# Patient Record
Sex: MALE | Race: ASIAN
[De-identification: names, ages, dates, MRNs, and addresses within clinical notes are randomized per-mention and may not be internally consistent; named-entity substitution may affect disease eponyms.]

---

## 2019-01-18 ENCOUNTER — HOSPITAL ENCOUNTER (EMERGENCY)
Dept: HOSPITAL 74 - JER | Age: 24
Discharge: HOME | End: 2019-01-18
Payer: COMMERCIAL

## 2019-01-18 VITALS — TEMPERATURE: 98.4 F | SYSTOLIC BLOOD PRESSURE: 112 MMHG | DIASTOLIC BLOOD PRESSURE: 62 MMHG | HEART RATE: 80 BPM

## 2019-01-18 VITALS — BODY MASS INDEX: 28.1 KG/M2

## 2019-01-18 DIAGNOSIS — N50.811: Primary | ICD-10-CM

## 2019-01-18 LAB
ANION GAP SERPL CALC-SCNC: 7 MMOL/L (ref 8–16)
APPEARANCE UR: (no result)
BASOPHILS # BLD: 0.4 % (ref 0–2)
BILIRUB UR STRIP.AUTO-MCNC: NEGATIVE MG/DL
BUN SERPL-MCNC: 14 MG/DL (ref 7–18)
CALCIUM SERPL-MCNC: 9.9 MG/DL (ref 8.5–10.1)
CHLORIDE SERPL-SCNC: 105 MMOL/L (ref 98–107)
CO2 SERPL-SCNC: 28 MMOL/L (ref 21–32)
COLOR UR: (no result)
CREAT SERPL-MCNC: 1.2 MG/DL (ref 0.55–1.3)
DEPRECATED RDW RBC AUTO: 12.4 % (ref 11.9–15.9)
EOSINOPHIL # BLD: 0.1 % (ref 0–4.5)
EPITH CASTS URNS QL MICRO: (no result) /HPF
GLUCOSE SERPL-MCNC: 87 MG/DL (ref 74–106)
HCT VFR BLD CALC: 47.6 % (ref 35.4–49)
HGB BLD-MCNC: 16.4 GM/DL (ref 11.7–16.9)
KETONES UR QL STRIP: (no result)
LEUKOCYTE ESTERASE UR QL STRIP.AUTO: NEGATIVE
LYMPHOCYTES # BLD: 6.8 % (ref 8–40)
MCH RBC QN AUTO: 30.8 PG (ref 25.7–33.7)
MCHC RBC AUTO-ENTMCNC: 34.3 G/DL (ref 32–35.9)
MCV RBC: 89.7 FL (ref 80–96)
MONOCYTES # BLD AUTO: 2.6 % (ref 3.8–10.2)
MUCOUS THREADS URNS QL MICRO: (no result)
NEUTROPHILS # BLD: 90.1 % (ref 42.8–82.8)
NITRITE UR QL STRIP: NEGATIVE
PH UR: 5 [PH] (ref 5–8)
PLATELET # BLD AUTO: 169 K/MM3 (ref 134–434)
PMV BLD: 11 FL (ref 7.5–11.1)
POTASSIUM SERPLBLD-SCNC: 4.1 MMOL/L (ref 3.5–5.1)
PROT UR QL STRIP: (no result)
PROT UR QL STRIP: NEGATIVE
RBC # BLD AUTO: 5.31 M/MM3 (ref 4–5.6)
SODIUM SERPL-SCNC: 140 MMOL/L (ref 136–145)
SP GR UR: 1.02 (ref 1.01–1.03)
UROBILINOGEN UR STRIP-MCNC: NEGATIVE MG/DL (ref 0.2–1)
WBC # BLD AUTO: 16.4 K/MM3 (ref 4–10)

## 2019-01-18 PROCEDURE — 3E0337Z INTRODUCTION OF ELECTROLYTIC AND WATER BALANCE SUBSTANCE INTO PERIPHERAL VEIN, PERCUTANEOUS APPROACH: ICD-10-PCS

## 2019-01-18 NOTE — PDOC
Rapid Medical Evaluation


Time Seen by Provider: 01/18/19 14:06


Medical Evaluation: 


 Allergies











Allergy/AdvReac Type Severity Reaction Status Date / Time


 


No Known Allergies Allergy   Verified 03/02/15 17:57











01/18/19 14:06





I have performed a brief in-person evaluation of this patient.





The patient presents with a chief complaint of: Sudden onset R flank pain 

radiating to R groin x 1 hr. No other sxs. No h/o renal stones. No sig hx





Pertinent physical exam findings:Stable, no obvious CVAT, NT to abd





I have ordered the following:labs/ua





The patient will proceed to the ED for further evaluation.











**Discharge Disposition





- Diagnosis


 Right flank pain








- Referrals





- Patient Instructions





- Post Discharge Activity

## 2019-01-18 NOTE — PDOC
History of Present Illness





- General


Chief Complaint: Pain


Stated Complaint: LOWER BACK PAIN


Time Seen by Provider: 01/18/19 14:06


History Source: Patient


Exam Limitations: No Limitations





Past History





- Past Medical History


Allergies/Adverse Reactions: 


 Allergies











Allergy/AdvReac Type Severity Reaction Status Date / Time


 


No Known Allergies Allergy   Verified 01/18/19 14:07











Home Medications: 


Ambulatory Orders





NK [No Known Home Medication]  03/02/15 








Asthma: Yes


COPD: No





- Immunization History


Immunization Up to Date: Yes





- Suicide/Smoking/Psychosocial Hx


Smoking History: Never smoked


Hx Alcohol Use: No


Drug/Substance Use Hx: No


Substance Use Type: None





*Physical Exam





- Vital Signs


 Last Vital Signs











Temp Pulse Resp BP Pulse Ox


 


 97.6 F   73   18   116/61   98 


 


 01/18/19 14:07  01/18/19 14:07  01/18/19 14:07  01/18/19 14:07  01/18/19 14:07














- Physical Exam


General Appearance: No: Apparent Distress


Respiratory/Chest: positive: Lungs Clear, Normal Breath Sounds.  negative: 

Respiratory Distress


Cardiovascular: positive: Regular Rhythm, Regular Rate, S1, S2.  negative: 

Murmur


Gastrointestinal/Abdominal: positive: Normal Bowel Sounds, Soft.  negative: 

Tender, Distended, Guarding, Rebound


Male Genitalia: positive: normal genitalia.  negative: testicular tenderness, 

testicular mass, inguinal hernia, hernia


Musculoskeletal: negative: CVA Tenderness


Neurologic: positive: Alert





Moderate Sedation





- Procedure Monitoring


Vital Signs: 


Procedure Monitoring Vital Signs











Temperature  97.6 F   01/18/19 14:07


 


Pulse Rate  73   01/18/19 14:07


 


Respiratory Rate  18   01/18/19 14:07


 


Blood Pressure  116/61   01/18/19 14:07


 


O2 Sat by Pulse Oximetry (%)  98   01/18/19 14:07











ED Treatment Course





- LABORATORY


CBC & Chemistry Diagram: 


 01/18/19 17:06





 01/18/19 17:06





- RADIOLOGY


Radiology Studies Ordered: 














 Category Date Time Status


 


 SCROTUM AND CONTENTS US [US] Stat Ultrasound  01/18/19 14:36 Ordered














Medical Decision Making





- Medical Decision Making








24 y/o M with no sig pmh presents with R lower back pain radiating to groin 

which started around 2 hours ago while getting out of shower. Also mentions 

having some R testicular pain. Denies fever, chills, sob, cp, abd pain, n/v/d, 

hematuria, dysuria, penile discharge. States is not sexually active





PE unremarkable; not suspicious for testicular torsion; consider kidney stones 

though no flank pain, hematuria and patient appears comfortable


Plan: UA, testicular ultrasound





01/18/19 14:40








UA showed hematuria


WBC of 16.4 noted, but no evidence of infection in urine 


CT A/P showed no kidney stones or other acute findings


Testicular sono inconclusive, but repeated again and showed normal flow





Patient appears uncomfortable


Possible passed kidney stone?


Will refer to urology





01/18/19 18:50








*DC/Admit/Observation/Transfer


Diagnosis at time of Disposition: 


 Right testicular pain





Lower back pain


Qualifiers:


 Chronicity: acute Back pain laterality: right Sciatica presence: without 

sciatica Qualified Code(s): M54.5 - Low back pain








- Discharge Dispostion


Disposition: HOME


Condition at time of disposition: Stable


Decision to Admit order: No





- Referrals


Referrals: 


Jeremiah Perez MD [Staff Physician] - 3 days





- Patient Instructions


Printed Discharge Instructions:  DI for Hematuria, DI for Kidney Stones


Additional Instructions: 





Thank you for choosing [facility].  It was a pleasure taking 

care of you.  





Your CT scan of abdomen/pelvis and ultrasound of scrotum were normal


You were noted to have blood in the urine


It is possible you may have passed a kidney stone


You were referred to urology for further evaluation





Return to the Emergency Department if your symptoms worsen or persist, you have 

fever, shortness of breath, chest pain, severe abdominal pain, vomiting, blood 

in urine or other concerning symptoms. 





- Post Discharge Activity